# Patient Record
Sex: FEMALE | URBAN - METROPOLITAN AREA
[De-identification: names, ages, dates, MRNs, and addresses within clinical notes are randomized per-mention and may not be internally consistent; named-entity substitution may affect disease eponyms.]

---

## 2018-10-09 ENCOUNTER — APPOINTMENT (OUTPATIENT)
Dept: URBAN - METROPOLITAN AREA SURGERY 9 | Age: 78
Setting detail: DERMATOLOGY
End: 2018-10-11

## 2018-10-09 PROBLEM — Z85.828 PERSONAL HISTORY OF OTHER MALIGNANT NEOPLASM OF SKIN: Status: ACTIVE | Noted: 2018-10-09

## 2018-10-09 PROBLEM — M12.9 ARTHROPATHY, UNSPECIFIED: Status: ACTIVE | Noted: 2018-10-09

## 2018-10-09 PROBLEM — I10 ESSENTIAL (PRIMARY) HYPERTENSION: Status: ACTIVE | Noted: 2018-10-09

## 2018-10-09 PROBLEM — E13.9 OTHER SPECIFIED DIABETES MELLITUS WITHOUT COMPLICATIONS: Status: ACTIVE | Noted: 2018-10-09

## 2018-10-09 PROBLEM — C44.329 SQUAMOUS CELL CARCINOMA OF SKIN OF OTHER PARTS OF FACE: Status: ACTIVE | Noted: 2018-10-09

## 2018-10-09 PROCEDURE — 17312 MOHS ADDL STAGE: CPT

## 2018-10-09 PROCEDURE — OTHER MOHS SURGERY: OTHER

## 2018-10-09 PROCEDURE — OTHER CONSULTATION FOR MOHS SURGERY: OTHER

## 2018-10-09 PROCEDURE — OTHER RETURN TO REFERRING PROVIDER: OTHER

## 2018-10-09 PROCEDURE — 17311 MOHS 1 STAGE H/N/HF/G: CPT

## 2018-10-09 NOTE — PROCEDURE: CONSULTATION FOR MOHS SURGERY
Detail Level: Detailed
Body Location Override (Optional - Billing Will Still Be Based On Selected Body Map Location If Applicable): left medial can this
X Size Of Lesion In Cm (Optional): 0
Incorporate Mauc In Note: Yes

## 2018-10-09 NOTE — PROCEDURE: MOHS SURGERY
Denies known Latex allergy or symptoms of Latex sensitivity.  Medications reviewed and updated.  History   Smoking Status   • Current Every Day Smoker   • Types: Cigarettes   Smokeless Tobacco   • Never Used     Patient [facility] to [facility] with complaints of dizziness and headache.  Seen on 2/8/18 and was prescribed cefdinir.  Rash from cefdinir.  Stopped medication.  Not taking prednisone.  Blood pressure has been high.  Has an appt with PMD to address bp issues.  Unsure if dizziness is due to uri symptoms and high bp.     Hatchet Flap Text: The defect edges were debeveled with a #15 scalpel blade.  Given the location of the defect, shape of the defect and the proximity to free margins a hatchet flap was deemed most appropriate.  Using a sterile surgical marker, an appropriate hatchet flap was drawn incorporating the defect and placing the expected incisions within the relaxed skin tension lines where possible.    The area thus outlined was incised deep to adipose tissue with a #15 scalpel blade.  The skin margins were undermined to an appropriate distance in all directions utilizing iris scissors.

## 2018-10-09 NOTE — HPI: MOHS SURGERY CONSULTATION
Has The Cancer Been Biopsied Before?: has been previously biopsied
Who Is Your Referring Provider?: Dr. Nunez
When Was Your Biopsy?: 9/13/18

## 2018-10-09 NOTE — PROCEDURE: MOHS SURGERY
Body Location Override (Optional - Billing Will Still Be Based On Selected Body Map Location If Applicable): left medial canthus

## 2025-02-06 NOTE — PROCEDURE: MOHS SURGERY
Medication:     FLUoxetine (PROzac) 20 MG capsule TAKE 1 CAPSULE BY MOUTH TWICE DAILY 180 capsule    passed protocol.   Last office visit date: 1/21/25  Next appointment scheduled?: No        S Plasty Text: Given the location and shape of the defect, and the orientation of relaxed skin tension lines, an S-plasty was deemed most appropriate for repair.  Using a sterile surgical marker, the appropriate outline of the S-plasty was drawn, incorporating the defect and placing the expected incisions within the relaxed skin tension lines where possible.  The area thus outlined was incised deep to adipose tissue with a #15 scalpel blade.  The skin margins were undermined to an appropriate distance in all directions utilizing iris scissors. The skin flaps were advanced over the defect.  The opposing margins were then approximated with interrupted buried subcutaneous sutures.